# Patient Record
Sex: FEMALE | URBAN - METROPOLITAN AREA
[De-identification: names, ages, dates, MRNs, and addresses within clinical notes are randomized per-mention and may not be internally consistent; named-entity substitution may affect disease eponyms.]

---

## 2022-09-29 ENCOUNTER — APPOINTMENT (RX ONLY)
Dept: URBAN - METROPOLITAN AREA CLINIC 55 | Facility: CLINIC | Age: 37
Setting detail: DERMATOLOGY
End: 2022-09-29

## 2022-09-29 DIAGNOSIS — Z41.9 ENCOUNTER FOR PROCEDURE FOR PURPOSES OTHER THAN REMEDYING HEALTH STATE, UNSPECIFIED: ICD-10-CM

## 2022-09-29 PROCEDURE — ? BOTOX

## 2022-09-29 NOTE — PROCEDURE: BOTOX
Show Lcl Units: No
Show Topical Anesthesia: Yes
[facility] Units: 0
Forehead Units: 4
Detail Level: Detailed
Show Inventory Tab: Show
Consent was obtained.
Post-Care Instructions: Patient instructed to not lie down for 4 hours and limit physical activity for 24 hours.
Periorbital Skin Units: 8

## 2022-10-07 ENCOUNTER — APPOINTMENT (RX ONLY)
Dept: URBAN - METROPOLITAN AREA CLINIC 55 | Facility: CLINIC | Age: 37
Setting detail: DERMATOLOGY
End: 2022-10-07

## 2022-10-07 DIAGNOSIS — Z41.9 ENCOUNTER FOR PROCEDURE FOR PURPOSES OTHER THAN REMEDYING HEALTH STATE, UNSPECIFIED: ICD-10-CM

## 2022-10-07 PROCEDURE — ? SCULPTRA

## 2022-10-07 PROCEDURE — ? HYALURONIDASE INJECTION

## 2022-10-07 PROCEDURE — ? INVENTORY

## 2022-10-07 PROCEDURE — ? FILLERS

## 2022-10-07 ASSESSMENT — LOCATION ZONE DERM: LOCATION ZONE: LIP

## 2022-10-07 ASSESSMENT — LOCATION SIMPLE DESCRIPTION DERM: LOCATION SIMPLE: RIGHT LIP

## 2022-10-07 ASSESSMENT — LOCATION DETAILED DESCRIPTION DERM: LOCATION DETAILED: RIGHT UPPER CUTANEOUS LIP

## 2022-10-07 NOTE — PROCEDURE: SCULPTRA
Show Right And Left Pa Volume?: No
Additional Anesthesia Volume In Cc: 0
Show Chin Volume?: Yes
Post-Care Instructions: After care instructions were provided to the patient.
Vials Reconstituted (Required For Inventory): 1
Anesthesia Type: 1% lidocaine with epinephrine
Detail Level: Detailed
Show Inventory Tab: Show
Dilution Method: The Sculptra was reconstituted with 8 ml of sterile water and 2cc 2% plain lidocaine for each vial.
Consent: Written consent obtained.
Anesthesia Volume In Cc: 0.5
Injection Technique: The Sculptra was injected to the areas shown in black with a 25g cannula after prepping the skin with alcohol and /or chlorhexidine and providing appropriate anesthesia.

## 2022-10-07 NOTE — PROCEDURE: HYALURONIDASE INJECTION
Detail Level: Detailed
Total Volume (Ccs): 0.1
Hyaluronidase Preparation: hyaluronidase
Consent: The risks of contour defects and dimpling of the skin were reviewed with the patient prior to the injection.

## 2022-11-18 ENCOUNTER — APPOINTMENT (RX ONLY)
Dept: URBAN - METROPOLITAN AREA CLINIC 55 | Facility: CLINIC | Age: 37
Setting detail: DERMATOLOGY
End: 2022-11-18

## 2022-11-18 DIAGNOSIS — Z41.9 ENCOUNTER FOR PROCEDURE FOR PURPOSES OTHER THAN REMEDYING HEALTH STATE, UNSPECIFIED: ICD-10-CM

## 2022-11-18 PROCEDURE — ? SCULPTRA

## 2022-11-18 NOTE — PROCEDURE: SCULPTRA
Show Inventory Tab: Show
Post-Care Instructions: After care instructions were provided to the patient.
Show Zygomatic Arches Volume?: Yes
Left Cheek Filler Volume In Cc: 0
Show Right And Left Dorsal Hands Volume?: No
Dilution Method: The Sculptra was reconstituted with 8 ml of sterile water and 2cc 2% plain lidocaine for each vial.
Treatment Number: 2
Anesthesia Volume In Cc: 0.5
Injection Technique: The Sculptra was injected to the areas shown in black with a 25g cannula after prepping the skin with alcohol and /or chlorhexidine and providing appropriate anesthesia.
Consent: Written consent obtained.
Anesthesia Type: 1% lidocaine with epinephrine
Detail Level: Detailed
Vials Reconstituted (Required For Inventory): 1

## 2023-01-20 ENCOUNTER — APPOINTMENT (RX ONLY)
Dept: URBAN - METROPOLITAN AREA CLINIC 55 | Facility: CLINIC | Age: 38
Setting detail: DERMATOLOGY
End: 2023-01-20

## 2023-01-20 DIAGNOSIS — Z41.9 ENCOUNTER FOR PROCEDURE FOR PURPOSES OTHER THAN REMEDYING HEALTH STATE, UNSPECIFIED: ICD-10-CM

## 2023-01-20 PROCEDURE — ? BOTOX

## 2023-01-20 NOTE — PROCEDURE: BOTOX
Additional Area 1 Units: 0
Show Depressor Anguli Units: Yes
Incrementing Botox Units: By 0.5 Units
Show Right And Left Pupillary Line Units: No
Detail Level: Detailed
Forehead Units: 4
Consent was obtained.
Show Inventory Tab: Show
Glabellar Complex Units: 16
Post-Care Instructions: Patient instructed to not lie down for 4 hours and limit physical activity for 24 hours.

## 2023-03-09 ENCOUNTER — APPOINTMENT (RX ONLY)
Dept: URBAN - METROPOLITAN AREA CLINIC 55 | Facility: CLINIC | Age: 38
Setting detail: DERMATOLOGY
End: 2023-03-09

## 2023-03-09 DIAGNOSIS — Z41.9 ENCOUNTER FOR PROCEDURE FOR PURPOSES OTHER THAN REMEDYING HEALTH STATE, UNSPECIFIED: ICD-10-CM

## 2023-03-09 PROCEDURE — ? INVENTORY

## 2023-03-09 PROCEDURE — ? HYDRAFACIAL

## 2023-03-09 ASSESSMENT — LOCATION DETAILED DESCRIPTION DERM: LOCATION DETAILED: INFERIOR MID FOREHEAD

## 2023-03-09 ASSESSMENT — LOCATION SIMPLE DESCRIPTION DERM: LOCATION SIMPLE: INFERIOR FOREHEAD

## 2023-03-09 ASSESSMENT — LOCATION ZONE DERM: LOCATION ZONE: FACE

## 2023-03-09 NOTE — PROCEDURE: HYDRAFACIAL
Number Of Passes Step 5: 0
Consent: Written consent obtained, risks reviewed including but not limited to crusting, scabbing, blistering, scarring, darker or lighter pigmentary change, bruising, and/or incomplete response.
Solution Override
Tip Override
Post-Care Instructions: I reviewed with the patient in detail post-care instructions. Patient should stay away from the sun and wear sun protection until treated areas are fully healed.
Comments: Signature HF. Blue tip, 15 % peel, 10 min red led . Nose.
Treatment Number: 1
Indication: skin texture
Location: face

## 2023-04-05 ENCOUNTER — APPOINTMENT (RX ONLY)
Dept: URBAN - METROPOLITAN AREA CLINIC 55 | Facility: CLINIC | Age: 38
Setting detail: DERMATOLOGY
End: 2023-04-05

## 2023-04-05 DIAGNOSIS — Z41.9 ENCOUNTER FOR PROCEDURE FOR PURPOSES OTHER THAN REMEDYING HEALTH STATE, UNSPECIFIED: ICD-10-CM

## 2023-04-05 PROCEDURE — ? BOTOX

## 2023-04-05 NOTE — PROCEDURE: BOTOX
Show Masseter Units: Yes
Periorbital Skin Units: 0
Show Right And Left Periorbital Units: No
Consent was obtained.
Post-Care Instructions: Patient instructed to not lie down for 4 hours and limit physical activity for 24 hours.
Periorbital Skin Units: 8
Incrementing Botox Units: By 0.5 Units
Detail Level: Detailed
Dilution (U/0.1 Cc): 4
Glabellar Complex Units: 16
Show Inventory Tab: Show

## 2023-04-20 ENCOUNTER — APPOINTMENT (RX ONLY)
Dept: URBAN - METROPOLITAN AREA CLINIC 55 | Facility: CLINIC | Age: 38
Setting detail: DERMATOLOGY
End: 2023-04-20

## 2023-04-20 DIAGNOSIS — Z41.9 ENCOUNTER FOR PROCEDURE FOR PURPOSES OTHER THAN REMEDYING HEALTH STATE, UNSPECIFIED: ICD-10-CM

## 2023-04-20 PROCEDURE — ? HYDRAFACIAL

## 2023-04-20 PROCEDURE — ? INVENTORY

## 2023-04-20 ASSESSMENT — LOCATION SIMPLE DESCRIPTION DERM: LOCATION SIMPLE: INFERIOR FOREHEAD

## 2023-04-20 ASSESSMENT — LOCATION ZONE DERM: LOCATION ZONE: FACE

## 2023-04-20 ASSESSMENT — LOCATION DETAILED DESCRIPTION DERM: LOCATION DETAILED: INFERIOR MID FOREHEAD

## 2023-04-20 NOTE — PROCEDURE: HYDRAFACIAL
Tip Override
Vacuum Pressure Low Setting (Will Not Render If Set To 0): 0
Indication: skin texture
Location: face
Consent: Written consent obtained, risks reviewed including but not limited to crusting, scabbing, blistering, scarring, darker or lighter pigmentary change, bruising, and/or incomplete response.
Solution Override
Post-Care Instructions: I reviewed with the patient in detail post-care instructions. Patient should stay away from the sun and wear sun protection until treated areas are fully healed.
Comments: Signature HF. Blue tip, 15% peel. 10 min red led.

## 2023-06-06 ENCOUNTER — APPOINTMENT (RX ONLY)
Dept: URBAN - METROPOLITAN AREA CLINIC 55 | Facility: CLINIC | Age: 38
Setting detail: DERMATOLOGY
End: 2023-06-06

## 2023-06-06 DIAGNOSIS — Z41.9 ENCOUNTER FOR PROCEDURE FOR PURPOSES OTHER THAN REMEDYING HEALTH STATE, UNSPECIFIED: ICD-10-CM

## 2023-06-06 PROCEDURE — ? INVENTORY

## 2023-06-06 PROCEDURE — ? HYDRAFACIAL

## 2023-06-06 ASSESSMENT — LOCATION SIMPLE DESCRIPTION DERM: LOCATION SIMPLE: GLABELLA

## 2023-06-06 ASSESSMENT — LOCATION ZONE DERM: LOCATION ZONE: FACE

## 2023-06-06 ASSESSMENT — LOCATION DETAILED DESCRIPTION DERM: LOCATION DETAILED: GLABELLA

## 2023-06-06 NOTE — PROCEDURE: HYDRAFACIAL
Solution Override
Tip Override
Vacuum Pressure High Setting (Will Not Render If Set To 0): 0
Comments: Signature HF.. blue tip, 15% peel , 10 min red led
Indication: skin texture
Post-Care Instructions: I reviewed with the patient in detail post-care instructions. Patient should stay away from the sun and wear sun protection until treated areas are fully healed.
Location: face
Consent: Written consent obtained, risks reviewed including but not limited to crusting, scabbing, blistering, scarring, darker or lighter pigmentary change, bruising, and/or incomplete response.

## 2023-10-05 ENCOUNTER — APPOINTMENT (RX ONLY)
Dept: URBAN - METROPOLITAN AREA CLINIC 55 | Facility: CLINIC | Age: 38
Setting detail: DERMATOLOGY
End: 2023-10-05

## 2023-10-05 DIAGNOSIS — Z41.9 ENCOUNTER FOR PROCEDURE FOR PURPOSES OTHER THAN REMEDYING HEALTH STATE, UNSPECIFIED: ICD-10-CM

## 2023-10-05 PROCEDURE — ? BOTOX

## 2023-10-05 PROCEDURE — ? SCULPTRA

## 2023-10-05 NOTE — PROCEDURE: BOTOX
Depressor Anguli Oris Units: 0
Show Additional Area 2: Yes
Show Lcl Units: No
Periorbital Skin Units: 8
Additional Area 1 Location: chin
Incrementing Botox Units: By 0.5 Units
Additional Area 1 Units: 4
Detail Level: Detailed
Glabellar Complex Units: 16
Show Inventory Tab: Show
Consent was obtained.
Post-Care Instructions: Patient instructed to not lie down for 4 hours and limit physical activity for 24 hours.

## 2023-10-05 NOTE — PROCEDURE: SCULPTRA
Show Cheeks Volume?: Yes
Left Zygomatic Arche Filler Volume In Cc: 0
Post-Care Instructions: After care instructions were provided to the patient.
Show Inventory Tab: Show
Show Right And Left Lateral Face Volume?: No
Vials Reconstituted (Required For Inventory): 1
Anesthesia Type: 1% lidocaine with epinephrine
Dilution Method: The Sculptra was reconstituted with 8 ml of sterile water and 2cc 2% plain lidocaine for each vial.
Injection Technique: The Sculptra was injected to the areas shown in black with a 25g cannula after prepping the skin with alcohol and /or chlorhexidine and providing appropriate anesthesia.
Anesthesia Volume In Cc: 0.5
Consent: Written consent obtained.
Detail Level: Detailed